# Patient Record
Sex: MALE | Race: WHITE | NOT HISPANIC OR LATINO | Employment: FULL TIME | URBAN - METROPOLITAN AREA
[De-identification: names, ages, dates, MRNs, and addresses within clinical notes are randomized per-mention and may not be internally consistent; named-entity substitution may affect disease eponyms.]

---

## 2019-02-14 LAB — COLONOSCOPY: NORMAL

## 2020-01-20 ENCOUNTER — OFFICE VISIT (OUTPATIENT)
Dept: FAMILY MEDICINE | Facility: CLINIC | Age: 55
End: 2020-01-20
Payer: COMMERCIAL

## 2020-01-20 VITALS
OXYGEN SATURATION: 96 % | BODY MASS INDEX: 30.99 KG/M2 | SYSTOLIC BLOOD PRESSURE: 128 MMHG | TEMPERATURE: 98.4 F | DIASTOLIC BLOOD PRESSURE: 87 MMHG | WEIGHT: 216.5 LBS | HEIGHT: 70 IN | HEART RATE: 53 BPM | RESPIRATION RATE: 16 BRPM

## 2020-01-20 DIAGNOSIS — R10.13 DYSPEPSIA: Primary | ICD-10-CM

## 2020-01-20 RX ORDER — IBUPROFEN 400 MG/1
400 TABLET, FILM COATED ORAL EVERY 6 HOURS PRN
COMMUNITY
End: 2022-03-13

## 2020-01-20 SDOH — HEALTH STABILITY: MENTAL HEALTH: HOW MANY STANDARD DRINKS CONTAINING ALCOHOL DO YOU HAVE ON A TYPICAL DAY?: 1 OR 2

## 2020-01-20 SDOH — HEALTH STABILITY: MENTAL HEALTH: HOW OFTEN DO YOU HAVE 6 OR MORE DRINKS ON ONE OCCASION?: LESS THAN MONTHLY

## 2020-01-20 SDOH — HEALTH STABILITY: MENTAL HEALTH: HOW OFTEN DO YOU HAVE A DRINK CONTAINING ALCOHOL?: 2-3 TIMES A WEEK

## 2020-01-20 ASSESSMENT — MIFFLIN-ST. JEOR: SCORE: 1823.29

## 2020-01-20 NOTE — PATIENT INSTRUCTIONS
Patient Education     1) We will send you home with a stool kit to bring us back a sample to test for H. Pylori, the bacteria that causes most stomach ulcers  2)  a box of Omeprazole 20mg (over the counter) and start taking one a day. Give it 3 days to take full effect.  3) If not effective at controlling symptoms, let me know and we will increase you to 40mg a day  4) If H. Pylori positive, we will treat you with antibiotics  5) If H. Pylori negative, we will plan to keep you on omeprazole for a month, then try to wean you off. Talk to me before stopping the omeprazole for plan for weaning off.       Lifestyle Changes for Controlling GERD  When you have GERD, stomach acid feels as if it s backing up toward your mouth. Whether or not you take medicine to control your GERD, your symptoms can often be improved with lifestyle changes. Talk to your healthcare provider about the following suggestions. These suggestions may help you get relief from your symptoms.      Raise your head  Reflux is more likely to strike when you re lying down flat, because stomach fluid can flow backward more easily. Raising the head of your bed 4 to 6 inches can help. To do this:    Slide blocks or books under the legs at the head of your bed. Or, place a wedge under the mattress. Many [employer] can make a suitable wedge for you. The wedge should run from your waist to the top of your head.    Don t just prop your head on several pillows. This increases pressure on your stomach. It can make GERD worse.  Watch your eating habits  Certain foods may increase the acid in your stomach or relax the lower esophageal sphincter. This makes GERD more likely. It s best to avoid the following if they cause you symptoms:    Coffee, tea, and carbonated drinks (with and without caffeine)    Fatty, fried, or spicy food    Mint, chocolate, onions, and tomatoes    Peppermint    Any other foods that seem to irritate your stomach or cause you  pain  Relieve the pressure  Tips include the following:    Eat smaller meals, even if you have to eat more often.    Don t lie down right after you eat. Wait a few hours for your stomach to empty.    Avoid tight belts and tight-fitting clothes.    Lose excess weight.  Tobacco and alcohol  Avoid smoking tobacco and drinking alcohol. They can make GERD symptoms worse.  Date Last Reviewed: 7/1/2016 2000-2019 [employer]. [address] [place] [address], [place] [address]. All rights reserved. This information is not intended as a substitute for professional medical care. Always follow your healthcare professional's instructions.

## 2020-01-20 NOTE — NURSING NOTE
"54 year old  Chief Complaint   Patient presents with     Establish Care     Abdominal Pain     upper abdomen, bloating and a burning sensation x 1 month has gotten worse in last 2 days       Blood pressure 128/87, pulse 53, temperature 98.4  F (36.9  C), temperature source Oral, resp. rate 16, height 1.786 m (5' 10.32\"), weight 98.2 kg (216 lb 8 oz), SpO2 96 %. Body mass index is 30.79 kg/m .  There is no problem list on file for this patient.      Wt Readings from Last 2 Encounters:   01/20/20 98.2 kg (216 lb 8 oz)   05/09/14 90.7 kg (200 lb)     BP Readings from Last 3 Encounters:   01/20/20 128/87   05/09/14 124/64   10/23/13 130/79         Current Outpatient Medications   Medication     ibuprofen (ADVIL/MOTRIN) 400 MG tablet     No current facility-administered medications for this visit.        Social History     Tobacco Use     Smoking status: Never Smoker     Smokeless tobacco: Never Used   Substance Use Topics     Alcohol use: Yes     Alcohol/week: 3.3 standard drinks     Frequency: 2-3 times a week     Drinks per session: 1 or 2     Binge frequency: Less than monthly     Drug use: Never       Health Maintenance Due   Topic Date Due     PREVENTIVE CARE VISIT  1965     HEPATITIS C SCREENING  1965     ADVANCE CARE PLANNING  1965     COLONOSCOPY  02/08/1973     HIV SCREENING  02/08/1978     LIPID  12/21/2009     ZOSTER IMMUNIZATION (2 of 2) 04/29/2019       No results found for: PAP      January 20, 2020 10:42 AM    "

## 2020-01-20 NOTE — PROGRESS NOTES
SUBJECTIVE:   Vitaliy Gonzalez is a 54 year old male who presents to clinic today to establish care and to discuss the following problem(s).     # Acid Reflux  - more bloated for the the past month after eating  - resolves after a while    - recently did a 2 week vacation, was eating more and had more bloating  - particularly bothersome with drinking alcohol so for the past couple of weeks has limited alcohol intake  - seems to have worsened recently  - worse after eating    - burning sensation from upper abdomen extending up into neck/mouth  - slept upright last night to help and bed is already tilted upright due to wife's reflux    - took Tums, didn't help  - took a dose of wife's omeprazole 40mg which helped    - no difficulty swallowing  - no melanotic stools  - bloating eases off somewhat after bowel movement, has 2-3 BMs a day which is normal for him    - takes ibuprofen for low back pain, averages 400mg in a day, rarely will take up to 1800mg in a 24 hour period (twice in the past 6 months)  - drinks 1-2 alcoholic drinks 2-3 times a week    - symptoms not worse with exertion/playing hockey    ROS: Denies fevers, chills, chest pain, difficulty breathing    Past Medical History:   Diagnosis Date     Chronic low back pain with left-sided sciatica     L4-L5, has had success with steroid injections. Goes to [facility] orth[facility] in [place]     History of kidney stones     2 stones     Past Surgical History:   Procedure Laterality Date     NO HISTORY OF SURGERY       Family History   Problem Relation Age of Onset     Breast Cancer Mother 45     Dementia Mother      No Known Problems Brother      Heart Disease Paternal Grandfather 85     No Known Problems Brother      Atrial fibrillation Father      Diabetes Maternal Aunt      Colon Cancer Maternal Uncle 55     Prostate Cancer No family hx of      Social History     Tobacco Use     Smoking status: Never Smoker     Smokeless tobacco: Never Used   Substance Use  "Topics     Alcohol use: Yes     Alcohol/week: 3.3 standard drinks     Frequency: 2-3 times a week     Drinks per session: 1 or 2     Binge frequency: Less than monthly     Drug use: Never     Social History     Social History Narrative    Works as  for [employer] 2-3 times a week    Lives with wife    Has 3 kids, all in college. One lives at home getting master's. One at , one at [employer].        Current Outpatient Medications   Medication     ibuprofen (ADVIL/MOTRIN) 400 MG tablet     No current facility-administered medications for this visit.      I have reviewed the patient's past medical, surgical, family, and social history.     OBJECTIVE:   /87 (BP Location: Right arm, Patient Position: Sitting, Cuff Size: Adult Large)   Pulse 53   Temp 98.4  F (36.9  C) (Oral)   Resp 16   Ht 1.786 m (5' 10.32\")   Wt 98.2 kg (216 lb 8 oz)   SpO2 96%   BMI 30.79 kg/m      Constitutional: well-appearing, appears stated age  Eyes: conjunctivae without erythema, sclera anicteric.   Abdomen: normal bowel sounds, non-distended, no palpable organomegaly or masses. Mildly tender to palpation in epigastric area and RUQ. [place] negative and no rebound tenderness or guarding.   Skin: no rashes, lesions, or wounds  Psych: affect is full and appropriate, speech is fluent and non-pressured    ASSESSMENT AND PLAN:     (R10.13) Dyspepsia  (primary encounter diagnosis)  Comment: Symptoms consistent with dyspepsia without any red flag features other than Vitaliy's age. Checking stool for H. Pylori antigen. If positive, will treat. If negative, continue with trial of OTC omeprazole for 3 weeks then will plan to wean him off with H2 blocker. If OTC dose ineffective, will plan for 40mg daily. If unable to get him off of PPI and H. Pylori negative, will plan for endoscopy. Reviewed general measures for GERD relief. We discussed that his ibuprofen may be contributing but 400mg daily dose would be unlikely to " have caused a significant gastropathy.   Plan: Helicobacter pylori Antigen Stool          Ayad Lamar MD   [facility]  01/20/2020, 10:59 AM

## 2020-01-20 NOTE — LETTER
[email or web address] Customer Service  [facility]  [address], [address]  [place] [address]  Fax: [phone]  Phone: [phone]      2020      Vitaliy Gonzalez  [address]  [place] [address]        Dear Vitaliy,    Thank you for your interest in becoming a [email or web address] user!    Your access code is: [email or web address]  Expires: 3/20/2020 10:08 AM     Please access the [email or web address] website at [email or web address].  Below the ID and password fields, select the  Sign Up Now  as New User.  You will be prompted to enter the access code listed above as well as additional personal information.  Please follow the directions carefully when creating your username and password.    If you allow your access code to , or if you have any questions please call a [email or web address] Representative at [phone] during normal clinic hours.     Sincerely,      [email or web address] Customer Service  [facility]

## 2020-01-21 LAB — H PYLORI AG STL QL IA: NEGATIVE

## 2020-03-15 ENCOUNTER — HEALTH MAINTENANCE LETTER (OUTPATIENT)
Age: 55
End: 2020-03-15

## 2021-01-14 ENCOUNTER — HEALTH MAINTENANCE LETTER (OUTPATIENT)
Age: 56
End: 2021-01-14

## 2021-05-09 ENCOUNTER — HEALTH MAINTENANCE LETTER (OUTPATIENT)
Age: 56
End: 2021-05-09

## 2021-10-24 ENCOUNTER — HEALTH MAINTENANCE LETTER (OUTPATIENT)
Age: 56
End: 2021-10-24

## 2022-01-20 ENCOUNTER — E-VISIT (OUTPATIENT)
Dept: URGENT CARE | Facility: CLINIC | Age: 57
End: 2022-01-20
Payer: COMMERCIAL

## 2022-01-20 DIAGNOSIS — U07.1 INFECTION DUE TO 2019 NOVEL CORONAVIRUS: Primary | ICD-10-CM

## 2022-01-20 PROCEDURE — 99207 PR NO CHARGE LOS: CPT | Performed by: FAMILY MEDICINE

## 2022-01-20 NOTE — PATIENT INSTRUCTIONS
At this time you would not be cleared to fly until 1- 10 days after your symptoms stated on 1-.    For documentation of recovery- you have to be at least 10 days out from day symptoms started to be considered safe to travel for the next 90 days without additional testing needed.  You show a documentation of recovery letter along with your + test result from the 18th and you do not have to test any further.    If you can change your flight you can [place] back with us to provide us this updated info for a letter to clear you to fly ON OR AFTER 1-.    Destiney Diego MD

## 2022-01-24 ENCOUNTER — TELEPHONE (OUTPATIENT)
Dept: FAMILY MEDICINE | Facility: CLINIC | Age: 57
End: 2022-01-24
Payer: COMMERCIAL

## 2022-03-09 NOTE — PROGRESS NOTES
ASSESSMENT AND PLAN:     (R79.89) Elevated serum creatinine  (primary encounter diagnosis)  Comment: Creatinine of 1.34 at ED visit 2/19/2022. History of kidney stones 10+ years ago. 6 months of NSAID use for chronic lower back pain a few years ago. No personal or family history of hypertension or chronic kidney disease. No urinary symptoms including dysuria or hematuria.   Plan:   - Ordered Basic metabolic panel to recheck creatinine and BUN            (Z11.59) Encounter for hepatitis C screening test for low risk patient  Plan: Hepatitis C Screen Reflex to HCV RNA Quant and         Genotype           (Z13.220) Screening cholesterol level  Plan: Lipid panel reflex to direct LDL Fasting            (Z11.4) Screening for HIV (human immunodeficiency virus)  Plan: HIV Antigen Antibody Combo          (Z23) Need for shingles vaccine  Plan: ZOSTER VACCINE RECOMBINANT ADJUVANTED IM NJX            (R10.11) RUQ abdominal pain  Comment: One first time episode of severe RUQ pain that required ED visit on 2/19/2022. Resolved with Maalox and famotidine. Has not recurred. No additional work up at this time.    (L81.4) Solar lentigo  Comment: Multiple solar lentigines on the right cheek and temple.   Plan: Encouraged sunscreen use    Encouraged him to follow up as soon as he can for a wellness visit.    ELIZABETH ONEILL, MS3  [employer]    I was present with the medical student who participated in the service and in the documentation of the note. I have verified the history and personally performed the physical exam and medical decision making. I agree with the assessment and plan of care as documented in the note with the following additions:   none    Ayad Lamar MD  4:10 PM, March 13, 2022        SUBJECTIVE:   Linwood is a 57 year old male who presents to clinic today for a follow up visit from the ED.    # RUIQ Pain  Patient experiences severe RUQ pain of a few hours duration that prompted  "him to present to the ED on 2/19/2022. Did not have any known dietary triggers, though he had recently switched back to a normal diet after eating keto. He had never experienced anything like this before. Pain resolved with Maalox and famotidine. Since then, patient has not had any recurrence in the pain. Has never had any melena, hematochezia and no recent changes in bowel movements, nausea or vomiting. However, during his ED workup his creatinine was 1.34 prompting him to make this follow up appointment for a recheck. He is otherwise feeling well today.    #Elevated Creatinine  - History of 2 episodes of kidney stones 7-10 years ago  - no history of hypertension, chronic kidney disease, or diabetes  - Had a period of NSAID use for chronic back pain of 6 month duration a few years ago  - Has some urinary frequency but no dysuria, hematuria or other urinary symptoms  - alcohol use: 4-6 beers/week    #Skin Changes on Face  - prompted by his wife to come in for a few spots on his right cheek  - Spots have been here for a few years and are asymptomatic  - never seen dermatologist  - no personal or family history of skin cancer  -  at home, limited sun exposure  - never sunburned to a peel      Patient Active Problem List   Diagnosis     Chronic low back pain with left-sided sciatica     History of kidney stones     Current Outpatient Medications   Medication     ibuprofen (ADVIL/MOTRIN) 400 MG tablet     No current facility-administered medications for this visit.       I have reviewed the patient's relevant past medical history.     OBJECTIVE:   /84 (BP Location: Right arm, Patient Position: Sitting, Cuff Size: Adult Large)   Pulse 69   Temp 97.7  F (36.5  C) (Temporal)   Ht 1.786 m (5' 10.32\")   Wt 94 kg (207 lb 4 oz)   SpO2 97%   BMI 29.47 kg/m      Constitutional: well-appearing, appears stated age  Eyes: conjunctivae without erythema, sclera anicteric.  GI: No tenderness to palpation, " rebound or guarding. Liver edge palpated.   Skin: Scattered light brown macules and patches on the right cheek and right temple. Photo included.  Psych: affect is full and appropriate, speech is fluent and non-pressured

## 2022-03-11 ENCOUNTER — OFFICE VISIT (OUTPATIENT)
Dept: FAMILY MEDICINE | Facility: CLINIC | Age: 57
End: 2022-03-11
Payer: COMMERCIAL

## 2022-03-11 VITALS
SYSTOLIC BLOOD PRESSURE: 137 MMHG | HEIGHT: 70 IN | WEIGHT: 207.25 LBS | HEART RATE: 69 BPM | TEMPERATURE: 97.7 F | DIASTOLIC BLOOD PRESSURE: 84 MMHG | BODY MASS INDEX: 29.67 KG/M2 | OXYGEN SATURATION: 97 %

## 2022-03-11 DIAGNOSIS — R79.89 ELEVATED SERUM CREATININE: Primary | ICD-10-CM

## 2022-03-11 DIAGNOSIS — Z23 NEED FOR SHINGLES VACCINE: ICD-10-CM

## 2022-03-11 DIAGNOSIS — L81.4 SOLAR LENTIGO: ICD-10-CM

## 2022-03-11 DIAGNOSIS — Z13.220 SCREENING CHOLESTEROL LEVEL: ICD-10-CM

## 2022-03-11 DIAGNOSIS — R10.11 RUQ ABDOMINAL PAIN: ICD-10-CM

## 2022-03-11 DIAGNOSIS — Z11.59 ENCOUNTER FOR HEPATITIS C SCREENING TEST FOR LOW RISK PATIENT: ICD-10-CM

## 2022-03-11 DIAGNOSIS — Z11.4 SCREENING FOR HIV (HUMAN IMMUNODEFICIENCY VIRUS): ICD-10-CM

## 2022-03-11 LAB
ANION GAP SERPL CALCULATED.3IONS-SCNC: 2 MMOL/L (ref 3–14)
BUN SERPL-MCNC: 22 MG/DL (ref 7–30)
CALCIUM SERPL-MCNC: 9.9 MG/DL (ref 8.5–10.1)
CHLORIDE BLD-SCNC: 108 MMOL/L (ref 94–109)
CHOLEST SERPL-MCNC: 305 MG/DL
CO2 SERPL-SCNC: 28 MMOL/L (ref 20–32)
CREAT SERPL-MCNC: 1.18 MG/DL (ref 0.66–1.25)
FASTING STATUS PATIENT QL REPORTED: NO
GFR SERPL CREATININE-BSD FRML MDRD: 71 ML/MIN/1.73M2
GLUCOSE BLD-MCNC: 77 MG/DL (ref 70–99)
HCV AB SERPL QL IA: NONREACTIVE
HDLC SERPL-MCNC: 113 MG/DL
HIV 1+2 AB+HIV1 P24 AG SERPL QL IA: NONREACTIVE
LDLC SERPL CALC-MCNC: 179 MG/DL
NONHDLC SERPL-MCNC: 192 MG/DL
POTASSIUM BLD-SCNC: 5 MMOL/L (ref 3.4–5.3)
SODIUM SERPL-SCNC: 138 MMOL/L (ref 133–144)
TRIGL SERPL-MCNC: 66 MG/DL

## 2022-03-11 PROCEDURE — 86803 HEPATITIS C AB TEST: CPT | Performed by: FAMILY MEDICINE

## 2022-03-11 PROCEDURE — 83718 ASSAY OF LIPOPROTEIN: CPT | Performed by: FAMILY MEDICINE

## 2022-03-11 PROCEDURE — 87389 HIV-1 AG W/HIV-1&-2 AB AG IA: CPT | Performed by: FAMILY MEDICINE

## 2022-03-11 PROCEDURE — 82310 ASSAY OF CALCIUM: CPT | Performed by: FAMILY MEDICINE

## 2022-03-11 NOTE — NURSING NOTE
"57 year old  Chief Complaint   Patient presents with     Hospital F/U     ED  [place] epigastric pain        Blood pressure 137/84, pulse 69, temperature 97.7  F (36.5  C), temperature source Temporal, height 1.786 m (5' 10.32\"), weight 94 kg (207 lb 4 oz), SpO2 97 %. Body mass index is 29.47 kg/m .  Patient Active Problem List   Diagnosis     Chronic low back pain with left-sided sciatica     History of kidney stones       Wt Readings from Last 2 Encounters:   03/11/22 94 kg (207 lb 4 oz)   01/20/20 98.2 kg (216 lb 8 oz)     BP Readings from Last 3 Encounters:   03/11/22 137/84   01/20/20 128/87   05/09/14 124/64         Current Outpatient Medications   Medication     ibuprofen (ADVIL/MOTRIN) 400 MG tablet     No current facility-administered medications for this visit.       Social History     Tobacco Use     Smoking status: Never Smoker     Smokeless tobacco: Never Used   Substance Use Topics     Alcohol use: Yes     Alcohol/week: 3.3 standard drinks     Drug use: Never       Health Maintenance Due   Topic Date Due     PREVENTIVE CARE VISIT  Never done     ADVANCE CARE PLANNING  Never done     HIV SCREENING  Never done     HEPATITIS C SCREENING  Never done     LIPID  12/21/2009     ZOSTER IMMUNIZATION (2 of 2) 04/29/2019     INFLUENZA VACCINE (1) 09/01/2021     PHQ-2 (once per calendar year)  01/01/2022       No results found for: PAP      March 11, 2022 8:17 AM  "

## 2022-03-15 ENCOUNTER — MYC MEDICAL ADVICE (OUTPATIENT)
Dept: FAMILY MEDICINE | Facility: CLINIC | Age: 57
End: 2022-03-15
Payer: COMMERCIAL

## 2022-03-15 DIAGNOSIS — Z13.220 SCREENING CHOLESTEROL LEVEL: Primary | ICD-10-CM

## 2022-03-18 ENCOUNTER — LAB (OUTPATIENT)
Dept: LAB | Facility: CLINIC | Age: 57
End: 2022-03-18
Payer: COMMERCIAL

## 2022-03-18 DIAGNOSIS — Z13.220 SCREENING CHOLESTEROL LEVEL: ICD-10-CM

## 2022-03-18 LAB
CHOLEST SERPL-MCNC: 245 MG/DL
FASTING STATUS PATIENT QL REPORTED: YES
HDLC SERPL-MCNC: 88 MG/DL
LDLC SERPL CALC-MCNC: 144 MG/DL
NONHDLC SERPL-MCNC: 157 MG/DL
TRIGL SERPL-MCNC: 63 MG/DL

## 2022-03-18 PROCEDURE — 80061 LIPID PANEL: CPT

## 2022-06-05 ENCOUNTER — HEALTH MAINTENANCE LETTER (OUTPATIENT)
Age: 57
End: 2022-06-05

## 2022-06-08 ENCOUNTER — LAB (OUTPATIENT)
Dept: LAB | Facility: CLINIC | Age: 57
End: 2022-06-08
Attending: FAMILY MEDICINE
Payer: COMMERCIAL

## 2022-06-08 DIAGNOSIS — Z20.822 ENCOUNTER FOR LABORATORY TESTING FOR COVID-19 VIRUS: ICD-10-CM

## 2022-06-08 PROCEDURE — U0003 INFECTIOUS AGENT DETECTION BY NUCLEIC ACID (DNA OR RNA); SEVERE ACUTE RESPIRATORY SYNDROME CORONAVIRUS 2 (SARS-COV-2) (CORONAVIRUS DISEASE [COVID-19]), AMPLIFIED PROBE TECHNIQUE, MAKING USE OF HIGH THROUGHPUT TECHNOLOGIES AS DESCRIBED BY CMS-2020-01-R: HCPCS

## 2022-06-08 PROCEDURE — U0005 INFEC AGEN DETEC AMPLI PROBE: HCPCS

## 2022-06-09 LAB — SARS-COV-2 RNA RESP QL NAA+PROBE: NEGATIVE

## 2022-06-23 ENCOUNTER — LAB (OUTPATIENT)
Dept: LAB | Facility: CLINIC | Age: 57
End: 2022-06-23
Attending: FAMILY MEDICINE
Payer: COMMERCIAL

## 2022-06-23 DIAGNOSIS — Z20.822 ENCOUNTER FOR LABORATORY TESTING FOR COVID-19 VIRUS: ICD-10-CM

## 2022-06-23 PROCEDURE — U0003 INFECTIOUS AGENT DETECTION BY NUCLEIC ACID (DNA OR RNA); SEVERE ACUTE RESPIRATORY SYNDROME CORONAVIRUS 2 (SARS-COV-2) (CORONAVIRUS DISEASE [COVID-19]), AMPLIFIED PROBE TECHNIQUE, MAKING USE OF HIGH THROUGHPUT TECHNOLOGIES AS DESCRIBED BY CMS-2020-01-R: HCPCS

## 2022-06-23 PROCEDURE — U0005 INFEC AGEN DETEC AMPLI PROBE: HCPCS

## 2022-06-24 LAB — SARS-COV-2 RNA RESP QL NAA+PROBE: NEGATIVE

## 2022-09-07 ENCOUNTER — NURSE TRIAGE (OUTPATIENT)
Dept: NURSING | Facility: CLINIC | Age: 57
End: 2022-09-07

## 2022-09-07 ENCOUNTER — HOSPITAL ENCOUNTER (EMERGENCY)
Facility: CLINIC | Age: 57
Discharge: HOME | End: 2022-09-07
Attending: PHYSICIAN ASSISTANT | Admitting: PHYSICIAN ASSISTANT
Payer: COMMERCIAL

## 2022-09-07 ENCOUNTER — APPOINTMENT (OUTPATIENT)
Dept: CT IMAGING | Facility: CLINIC | Age: 57
End: 2022-09-07
Attending: EMERGENCY MEDICINE
Payer: COMMERCIAL

## 2022-09-07 VITALS
HEART RATE: 71 BPM | TEMPERATURE: 97.2 F | HEIGHT: 69 IN | BODY MASS INDEX: 28.14 KG/M2 | OXYGEN SATURATION: 100 % | SYSTOLIC BLOOD PRESSURE: 120 MMHG | WEIGHT: 190 LBS | RESPIRATION RATE: 16 BRPM | DIASTOLIC BLOOD PRESSURE: 60 MMHG

## 2022-09-07 DIAGNOSIS — N20.0 KIDNEY STONE: ICD-10-CM

## 2022-09-07 LAB
ALBUMIN SERPL-MCNC: 3.6 G/DL (ref 3.4–5)
ALBUMIN UR-MCNC: NEGATIVE MG/DL
ALP SERPL-CCNC: 75 U/L (ref 40–150)
ALT SERPL W P-5'-P-CCNC: 25 U/L (ref 0–70)
ANION GAP SERPL CALCULATED.3IONS-SCNC: 3 MMOL/L (ref 3–14)
APPEARANCE UR: CLEAR
AST SERPL W P-5'-P-CCNC: 25 U/L (ref 0–45)
BASOPHILS # BLD AUTO: 0 10E3/UL (ref 0–0.2)
BASOPHILS NFR BLD AUTO: 1 %
BILIRUB SERPL-MCNC: 1.3 MG/DL (ref 0.2–1.3)
BILIRUB UR QL STRIP: NEGATIVE
BUN SERPL-MCNC: 17 MG/DL (ref 7–30)
CALCIUM SERPL-MCNC: 8.8 MG/DL (ref 8.5–10.1)
CHLORIDE BLD-SCNC: 107 MMOL/L (ref 94–109)
CO2 SERPL-SCNC: 26 MMOL/L (ref 20–32)
COLOR UR AUTO: ABNORMAL
CREAT SERPL-MCNC: 1.13 MG/DL (ref 0.66–1.25)
EOSINOPHIL # BLD AUTO: 0.5 10E3/UL (ref 0–0.7)
EOSINOPHIL NFR BLD AUTO: 8 %
ERYTHROCYTE [DISTWIDTH] IN BLOOD BY AUTOMATED COUNT: 13.2 % (ref 10–15)
GFR SERPL CREATININE-BSD FRML MDRD: 75 ML/MIN/1.73M2
GLUCOSE BLD-MCNC: 109 MG/DL (ref 70–99)
GLUCOSE UR STRIP-MCNC: NEGATIVE MG/DL
HCT VFR BLD AUTO: 44.2 % (ref 40–53)
HGB BLD-MCNC: 14.6 G/DL (ref 13.3–17.7)
HGB UR QL STRIP: ABNORMAL
IMM GRANULOCYTES # BLD: 0 10E3/UL
IMM GRANULOCYTES NFR BLD: 0 %
KETONES UR STRIP-MCNC: NEGATIVE MG/DL
LEUKOCYTE ESTERASE UR QL STRIP: NEGATIVE
LIPASE SERPL-CCNC: 86 U/L (ref 73–393)
LYMPHOCYTES # BLD AUTO: 1.7 10E3/UL (ref 0.8–5.3)
LYMPHOCYTES NFR BLD AUTO: 29 %
MCH RBC QN AUTO: 29.5 PG (ref 26.5–33)
MCHC RBC AUTO-ENTMCNC: 33 G/DL (ref 31.5–36.5)
MCV RBC AUTO: 89 FL (ref 78–100)
MONOCYTES # BLD AUTO: 0.7 10E3/UL (ref 0–1.3)
MONOCYTES NFR BLD AUTO: 11 %
NEUTROPHILS # BLD AUTO: 3.1 10E3/UL (ref 1.6–8.3)
NEUTROPHILS NFR BLD AUTO: 51 %
NITRATE UR QL: NEGATIVE
NRBC # BLD AUTO: 0 10E3/UL
NRBC BLD AUTO-RTO: 0 /100
PH UR STRIP: 5.5 [PH] (ref 5–7)
PLATELET # BLD AUTO: 256 10E3/UL (ref 150–450)
POTASSIUM BLD-SCNC: 4.5 MMOL/L (ref 3.4–5.3)
PROT SERPL-MCNC: 6.5 G/DL (ref 6.8–8.8)
RBC # BLD AUTO: 4.95 10E6/UL (ref 4.4–5.9)
RBC URINE: 1 /HPF
SODIUM SERPL-SCNC: 136 MMOL/L (ref 133–144)
SP GR UR STRIP: 1 (ref 1–1.03)
UROBILINOGEN UR STRIP-MCNC: NORMAL MG/DL
WBC # BLD AUTO: 6 10E3/UL (ref 4–11)
WBC URINE: 1 /HPF

## 2022-09-07 PROCEDURE — 74176 CT ABD & PELVIS W/O CONTRAST: CPT

## 2022-09-07 PROCEDURE — 85025 COMPLETE CBC W/AUTO DIFF WBC: CPT | Performed by: EMERGENCY MEDICINE

## 2022-09-07 PROCEDURE — 81001 URINALYSIS AUTO W/SCOPE: CPT | Performed by: PHYSICIAN ASSISTANT

## 2022-09-07 PROCEDURE — 83690 ASSAY OF LIPASE: CPT | Performed by: EMERGENCY MEDICINE

## 2022-09-07 PROCEDURE — 99285 EMERGENCY DEPT VISIT HI MDM: CPT | Mod: 25

## 2022-09-07 PROCEDURE — 250N000011 HC RX IP 250 OP 636: Performed by: EMERGENCY MEDICINE

## 2022-09-07 PROCEDURE — 80053 COMPREHEN METABOLIC PANEL: CPT | Performed by: EMERGENCY MEDICINE

## 2022-09-07 PROCEDURE — 80053 COMPREHEN METABOLIC PANEL: CPT | Performed by: PHYSICIAN ASSISTANT

## 2022-09-07 PROCEDURE — 258N000003 HC RX IP 258 OP 636: Performed by: EMERGENCY MEDICINE

## 2022-09-07 PROCEDURE — 85025 COMPLETE CBC W/AUTO DIFF WBC: CPT | Performed by: PHYSICIAN ASSISTANT

## 2022-09-07 PROCEDURE — 81001 URINALYSIS AUTO W/SCOPE: CPT | Performed by: EMERGENCY MEDICINE

## 2022-09-07 PROCEDURE — 36415 COLL VENOUS BLD VENIPUNCTURE: CPT | Performed by: EMERGENCY MEDICINE

## 2022-09-07 PROCEDURE — 96374 THER/PROPH/DIAG INJ IV PUSH: CPT

## 2022-09-07 PROCEDURE — 96361 HYDRATE IV INFUSION ADD-ON: CPT

## 2022-09-07 RX ORDER — TAMSULOSIN HYDROCHLORIDE 0.4 MG/1
0.4 CAPSULE ORAL DAILY
Qty: 10 CAPSULE | Refills: 0 | Status: SHIPPED | OUTPATIENT
Start: 2022-09-07 | End: 2023-03-07

## 2022-09-07 RX ORDER — OXYCODONE HYDROCHLORIDE 5 MG/1
5 TABLET ORAL EVERY 6 HOURS PRN
Qty: 12 TABLET | Refills: 0 | Status: SHIPPED | OUTPATIENT
Start: 2022-09-07 | End: 2022-09-10

## 2022-09-07 RX ORDER — ONDANSETRON 4 MG/1
4 TABLET, ORALLY DISINTEGRATING ORAL EVERY 8 HOURS PRN
Qty: 10 TABLET | Refills: 0 | Status: SHIPPED | OUTPATIENT
Start: 2022-09-07 | End: 2023-03-07

## 2022-09-07 RX ORDER — KETOROLAC TROMETHAMINE 15 MG/ML
15 INJECTION, SOLUTION INTRAMUSCULAR; INTRAVENOUS ONCE
Status: COMPLETED | OUTPATIENT
Start: 2022-09-07 | End: 2022-09-07

## 2022-09-07 RX ADMIN — KETOROLAC TROMETHAMINE 15 MG: 15 INJECTION, SOLUTION INTRAMUSCULAR; INTRAVENOUS at 11:57

## 2022-09-07 RX ADMIN — SODIUM CHLORIDE 1000 ML: 9 INJECTION, SOLUTION INTRAVENOUS at 11:57

## 2022-09-07 ASSESSMENT — ACTIVITIES OF DAILY LIVING (ADL): ADLS_ACUITY_SCORE: 35

## 2022-09-07 ASSESSMENT — ENCOUNTER SYMPTOMS
FLANK PAIN: 1
ABDOMINAL PAIN: 1

## 2022-09-07 NOTE — ED NOTES
Rapid Assessment Note    History:   Vitaliy Gonzalez is a 57 year old male who presents with acute right flank pain that feels like prior kidney stones, has not had any in over a decade.  No vomiting or fevers.  He would like CT to know how big it is and where it is.  Has never required surgeries for his kidney stones.    Exam:   General: no severe distress  HEENT: wearing mask  CV: appears well perfused  Resp: normal effort, speaks in full phrases  MSK: ambulatory  Neuro: clear speech, oriented  Psych: cooperative    Suspect recurrent R sided renal colic from ureteral stone, pt agrees to plan for CT, blood, UA, strain urine while awaiting further assessment.    Plan of Care:   I evaluated the patient and developed an initial plan of care. I discussed this plan and explained that I, or one of my partners, would be returning to complete the evaluation.     09/07/2022  [employer]    Portions of this medical record were completed by a scribe. UPON MY REVIEW AND AUTHENTICATION BY ELECTRONIC SIGNATURE, this confirms (a) I performed the applicable clinical services, and (b) the record is accurate.        Kalen Gallardo MD  09/07/22 8141

## 2022-09-07 NOTE — ED PROVIDER NOTES
History     Chief Complaint:  Flank Pain       HPI   Vitaliy Gonzalez is a 57 year old male with a history of kidney stones who presents to the ED for evaluation of flank/abdominal pain.  Patient reports onset of right-sided flank pain that began yesterday evening.  He describes this as feeling similar to his previous episode of kidney stones.  Pain has been constant since onset.  He notes that initially pain started in his RLQ, however has started to radiate into his right flank. Pain at its worst in 8-9/10 in severity. He tried ibuprofen without significant improvement of his pain. He denies any fevers, chills, nausea, vomiting, diarrhea, black or bloody stools.     ROS:  Review of Systems   Gastrointestinal: Positive for abdominal pain.   Genitourinary: Positive for flank pain.   All other systems reviewed and are negative.      Allergies:  No Known Allergies     Medications:    No medications    Past Medical History:    Past Medical History:   Diagnosis Date     Chronic low back pain with left-sided sciatica      History of kidney stones        Past Surgical History:    Past Surgical History:   Procedure Laterality Date     NO HISTORY OF SURGERY          Family History:    family history includes Atrial fibrillation in his father; Breast Cancer (age of onset: 45) in his mother; Colon Cancer (age of onset: 55) in his maternal uncle; Dementia in his mother; Diabetes in his maternal aunt; Diabetes Type 2  (age of onset: 52) in his brother; Heart Disease (age of onset: 85) in his paternal grandfather; No Known Problems in his brother; Prostate Cancer (age of onset: 79) in his father.    Social History:   reports that he has never smoked. He has never used smokeless tobacco. He reports current alcohol use of about 3.3 standard drinks of alcohol per week. He reports that he does not use drugs.    PCP: Ayad Lamar     Physical Exam     Patient Vitals for the past 24 hrs:   BP Temp Temp src Pulse Resp SpO2  "Height Weight   09/07/22 1144 120/60 97.2  F (36.2  C) Oral 71 16 100 % 1.753 m (5' 9\") 86.2 kg (190 lb)        Physical Exam  Constitutional: Pleasant. Cooperative.   Eyes: Pupils equally round and reactive  HENT: Head is normal in appearance. Oropharynx is normal with moist mucus membranes.  Cardiovascular: Regular rate and rhythm and without murmurs.  Respiratory: Normal respiratory effort, lungs are clear bilaterally.  GI: Abdomen is soft, non-tender, non-distended. No guarding, rebound, or rigidity.  Musculoskeletal: No asymmetry of the lower extremities, no tenderness to palpation. No CVA TTP.  Skin: Normal, without rash.  Neurologic: Cranial nerves grossly intact, normal cognition, no focal deficits. Alert and oriented x 3.   Psychiatric: Normal affect.  Nursing notes and vital signs reviewed.      Emergency Department Course     Imaging:  CT Abdomen Pelvis w/o Contrast   Final Result   IMPRESSION:    1.  5 mm stone at the right ureterovesicular junction with mild   proximal hydronephrosis and stranding.   2.  2 mm nonobstructing stone inferior right kidney.      AISHA NAGEL MD            SYSTEM ID:  [id number]         Laboratory:  Labs Ordered and Resulted from Time of ED Arrival to Time of ED Departure   COMPREHENSIVE METABOLIC PANEL - Abnormal       Result Value    Sodium 136      Potassium 4.5      Chloride 107      Carbon Dioxide (CO2) 26      Anion Gap 3      Urea Nitrogen 17      Creatinine 1.13      Calcium 8.8      Glucose 109 (*)     Alkaline Phosphatase 75      AST 25      ALT 25      Protein Total 6.5 (*)     Albumin 3.6      Bilirubin Total 1.3      GFR Estimate 75     ROUTINE UA WITH MICROSCOPIC REFLEX TO CULTURE - Abnormal    Color Urine Straw      Appearance Urine Clear      Glucose Urine Negative      Bilirubin Urine Negative      Ketones Urine Negative      Specific Gravity Urine 1.005      Blood Urine Small (*)     pH Urine 5.5      Protein Albumin Urine Negative      Urobilinogen Urine " Normal      Nitrite Urine Negative      Leukocyte Esterase Urine Negative      RBC Urine 1      WBC Urine 1     LIPASE - Normal    Lipase 86     CBC WITH PLATELETS AND DIFFERENTIAL    WBC Count 6.0      RBC Count 4.95      Hemoglobin 14.6      Hematocrit 44.2      MCV 89      MCH 29.5      MCHC 33.0      RDW 13.2      Platelet Count 256      % Neutrophils 51      % Lymphocytes 29      % Monocytes 11      % Eosinophils 8      % Basophils 1      % Immature Granulocytes 0      NRBCs per 100 WBC 0      Absolute Neutrophils 3.1      Absolute Lymphocytes 1.7      Absolute Monocytes 0.7      Absolute Eosinophils 0.5      Absolute Basophils 0.0      Absolute Immature Granulocytes 0.0      Absolute NRBCs 0.0        Emergency Department Course:    Reviewed:  I reviewed nursing notes, vitals, past medical history and Care Everywhere    Assessments:   I obtained history and examined the patient as noted above.    I rechecked the patient and explained findings.     Interventions:  Medications   0.9% sodium chloride BOLUS (0 mLs Intravenous Stopped 9/7/22 1558)   ketorolac (TORADOL) injection 15 mg (15 mg Intravenous Given 9/7/22 1157)        Disposition:  The patient was discharged to home.     Impression & Plan      Medical Decision Making:  Vitaliy Gonzalez is a 57 year old male who presents to the ED for evaluation of flank/abdominal pain.  See HPI as above for additional details.  Vitals and physical exam as above.  Differential is broad and included kidney stone, pyelonephritis, appendicitis,  pathology, perforated viscus, SBO, biliary pathology, shingles, amongst others.  Work-up obtained as above suggestive for kidney stone.  No associated JERONIMO nor evidence for UTI.  Symptoms improved with interventions as above.  With shared decision making, we will trial course of outpatient management.  Will send home with prescriptions as below.  Discussed narcotic precautions.  We will have him follow-up with urology with  persistent symptoms.  [place] patient was safe for discharge to home. Discussed reasons to return. All questions answered. Patient discharged to home in stable condition.    Diagnosis:    ICD-10-CM    1. Kidney stone  N20.0         Discharge Medications:  Discharge Medication List as of 9/7/2022  3:58 PM      START taking these medications    Details   ondansetron (ZOFRAN ODT) 4 MG ODT tab Take 1 tablet (4 mg) by mouth every 8 hours as needed for nausea, Disp-10 tablet, R-0, E-Prescribe      oxyCODONE (ROXICODONE) 5 MG tablet Take 1 tablet (5 mg) by mouth every 6 hours as needed for pain, Disp-12 tablet, R-0, E-Prescribe      tamsulosin (FLOMAX) 0.4 MG capsule Take 1 capsule (0.4 mg) by mouth daily, Disp-10 capsule, R-0, E-Prescribe              9/7/2022   Yaniv Salas PA-C     This record was created at least in part using electronic voice recognition software, so please excuse any typographical errors.       Yaniv Salas PA-C  09/07/22 9311

## 2022-09-07 NOTE — ED TRIAGE NOTES
Patient reports right flank pain. History of kidney stones.     Triage Assessment     Row Name 09/07/22 1127       Triage Assessment (Adult)    Airway WDL WDL       Cardiac WDL    Cardiac WDL WDL       Cognitive/Neuro/Behavioral WDL    Cognitive/Neuro/Behavioral WDL WDL

## 2022-09-07 NOTE — TELEPHONE ENCOUNTER
History of kidney stones    And now having flank pain all night    No blood in the urine    Was up all night with the pain    Advised going to the ED for evaluation    Gracie Bronson RN  [place] Nurse Advisor  8:10 AM  9/7/2022      Reason for Disposition    SEVERE pain (e.g., excruciating, scale 8-10) and present > 1 hour    Additional Information    Negative: Passed out (i.e., lost consciousness, collapsed and was not responding)    Negative: Shock suspected (e.g., cold/pale/clammy skin, too weak to stand, low BP, rapid pulse)    Negative: Sounds like a life-threatening emergency to the triager    Protocols used: FLANK PAIN-A-OH

## 2022-09-07 NOTE — DISCHARGE INSTRUCTIONS
Use ibuprofen 600mg every 6-8 hours for pain.  Use oxycodone for breakthrough pain. This is sedating. Do not drive after taking.  Use Zofran to help with nausea.  Use Tamsulosin to help facilitate stone passage.

## 2022-10-15 ENCOUNTER — HEALTH MAINTENANCE LETTER (OUTPATIENT)
Age: 57
End: 2022-10-15

## 2023-03-08 NOTE — PROGRESS NOTES
"[address], [address]  [facility] [address]  Phone: [phone]  Fax: [phone]  Primary Provider: Ayad Lamar  Pre-op Performing Provider: ANA OTT    PREOPERATIVE EVALUATION:  Today's date: 3/9/2023    Vitaliy Gonzalez is a 58 year old male who presents for a preoperative evaluation.    Surgical Information:  Surgery/Procedure: Hip Replacement  Surgery Location: [facility]  Surgeon: Bebeto Perez MD   Surgery Date: 4/5/23  Time of Surgery: TBD  Where patient plans to recover: At home with family  Fax number for surgical facility: [phone]    Type of Anesthesia Anticipated: General    Assessment & Plan     The proposed surgical procedure is considered INTERMEDIATE risk.    Vitaliy was seen today for pre-op exam.    Diagnoses and all orders for this visit:    Osteoarthritis of right hip, unspecified osteoarthritis type    Preop general physical exam      Osteoarthritis of right hip, unspecified osteoarthritis type  - Patient medically optimized for surgery baring any acute illness over next 4 weeks.   - METS activity >8. No difficulties climbing stairs, has continued to play hockey  - No past medical or surgical history complicating ability to proceed with surgery       Risks and Recommendations:  The patient has the following additional risks and recommendations for perioperative complications:   - No identified additional risk factors other than previously addressed    Medication Instructions:  Recommend holding supplemental \"[employer]\" 10 days prior to surgery  Patient is on no chronic medications    RECOMMENDATION:  APPROVAL GIVEN to proceed with proposed procedure, without further diagnostic evaluation.    Herminio Dela Cruz, MS3    I was present with the medical student who participated in the service and in the documentation of the note. I have verified the history and personally performed the physical exam and medical " decision making. I agree with the assessment and plan of care as documented in the note.     Ric De La Torre MD  11:09 AM, March 9, 2023      Subjective     HPI related to upcoming procedure:  57 yo male with past medical history of kidney stones here for pre-op for hip replacement. Has had progressive R hip pain found to have osteoarthritis on imaging in December 2022. Initial treatment with injection provided temporary but non-lasting relief. Has still been able to play hockey recently but only for about 20 second bouts with pain on exertion. No pain noticed at rest. Has taken Aleve PRN for pain.    No breathing difficulties, heart palpitations, nausea, vomiting, or changes in balance.    Preop Questions 3/2/2023   1. Have you ever had a heart attack or stroke? No   2. Have you ever had surgery on your heart or blood vessels, such as a stent placement, a coronary artery bypass, or surgery on an artery in your head, neck, heart, or legs? No   3. Do you have chest pain with activity? No   4. Do you have a history of  heart failure? No   5. Do you currently have a cold, bronchitis or symptoms of other infection? No   6. Do you have a cough, shortness of breath, or wheezing? No   7. Do you or anyone in your family have previous history of blood clots? No   8. Do you or does anyone in your family have a serious bleeding problem such as prolonged bleeding following surgeries or cuts? No   9. Have you ever had problems with anemia or been told to take iron pills? No   10. Have you had any abnormal blood loss such as black, tarry or bloody stools? No   11. Have you ever had a blood transfusion? No   12. Are you willing to have a blood transfusion if it is medically needed before, during, or after your surgery? Yes   13. Have you or any of your relatives ever had problems with anesthesia? No   14. Do you have sleep apnea, excessive snoring or daytime drowsiness? No   15. Do you have any artifical heart valves or other  implanted medical devices like a pacemaker, defibrillator, or continuous glucose monitor? No   16. Do you have artificial joints? No   17. Are you allergic to latex? No       Health Care Directive:  Patient does not have a Health Care Directive or Living Will: Discussed advance care planning with patient; information given to patient to review.    Preoperative Review of :   reviewed - no record of controlled substances prescribed.      Status of Chronic Conditions:  See problem list for active medical problems.  Problems all longstanding and stable, except as noted/documented.  See ROS for pertinent symptoms related to these conditions.      Review of Systems  CONSTITUTIONAL: NEGATIVE for fever, chills, change in weight  EYES: NEGATIVE for vision changes or irritation  ENT/MOUTH: NEGATIVE for ear, mouth and throat problems  RESP: NEGATIVE for significant cough or SOB  CV: NEGATIVE for chest pain, palpitations or peripheral edema  : negative for dysuria, hematuria, decreased urinary stream, erectile dysfunction    Patient Active Problem List    Diagnosis Date Noted     Chronic low back pain with left-sided sciatica      Priority: Medium     L4-L5, has had success with steroid injections. Goes to [facility] in [place]       History of kidney stones      Priority: Medium     2 stones        Past Medical History:   Diagnosis Date     Chronic low back pain with left-sided sciatica     L4-L5, has had success with steroid injections. Goes to [facility] in [place]     History of kidney stones     2 stones     Past Surgical History:   Procedure Laterality Date     NO HISTORY OF SURGERY       No current outpatient medications on file.       No Known Allergies     Social History     Tobacco Use     Smoking status: Never     Smokeless tobacco: Never   Substance Use Topics     Alcohol use: Yes     Alcohol/week: 3.3 standard drinks     Family History   Problem Relation Age of Onset     Breast Cancer  "Mother 45     Dementia Mother      No Known Problems Brother      Heart Disease Paternal Grandfather 85     Diabetes Type 2  Brother 52     Atrial fibrillation Father      Prostate Cancer Father 79     Diabetes Maternal Aunt      Colon Cancer Maternal Uncle 55     History   Drug Use Unknown         Objective     /83   Pulse 63   Temp 97.7  F (36.5  C)   Ht 1.753 m (5' 9.02\")   Wt 94.4 kg (208 lb 1.3 oz)   SpO2 97%   BMI 30.71 kg/m      Physical Exam    GENERAL APPEARANCE: healthy, alert and no distress     EYES: EOMI,  PERRL     HENT: nose and mouth without ulcers or lesions     NECK: no adenopathy, no asymmetry, masses, or scars and thyroid normal to palpation     RESP: lungs clear to auscultation - no rales, rhonchi or wheezes     CV: regular rates and rhythm, normal S1 S2, no S3 or S4 and no murmur, click or rub     ABDOMEN:  soft, nontender, no HSM or masses and bowel sounds normal     MS: extremities normal- no gross deformities noted, pain with manipulation of R lower extremity     SKIN: no suspicious lesions or rashes     NEURO: Normal strength and tone, sensory exam grossly normal, mentation intact and speech normal     PSYCH: mentation appears normal. and affect normal/bright     LYMPHATICS: No cervical adenopathy    Recent Labs   Lab Test 09/07/22  1154 03/11/22  0854   HGB 14.6  --      --     138   POTASSIUM 4.5 5.0   CR 1.13 1.18        Diagnostics:  No labs were ordered during this visit.   No EKG required, no history of coronary heart disease, significant arrhythmia, peripheral arterial disease or other structural heart disease.    Revised Cardiac Risk Index (RCRI):  The patient has the following serious cardiovascular risks for perioperative complications:   - No serious cardiac risks = 0 points     RCRI Interpretation: 0 points: Class I (very low risk - 0.4% complication rate)      Signed Electronically by: Ric De La Torre MD  Copy of this evaluation report is provided to " requesting physician.

## 2023-03-09 ENCOUNTER — OFFICE VISIT (OUTPATIENT)
Dept: FAMILY MEDICINE | Facility: CLINIC | Age: 58
End: 2023-03-09
Payer: COMMERCIAL

## 2023-03-09 VITALS
TEMPERATURE: 97.7 F | OXYGEN SATURATION: 97 % | HEART RATE: 63 BPM | BODY MASS INDEX: 30.82 KG/M2 | SYSTOLIC BLOOD PRESSURE: 122 MMHG | HEIGHT: 69 IN | WEIGHT: 208.08 LBS | DIASTOLIC BLOOD PRESSURE: 83 MMHG

## 2023-03-09 DIAGNOSIS — M16.11 OSTEOARTHRITIS OF RIGHT HIP, UNSPECIFIED OSTEOARTHRITIS TYPE: Primary | ICD-10-CM

## 2023-03-09 DIAGNOSIS — Z01.818 PREOP GENERAL PHYSICAL EXAM: ICD-10-CM

## 2023-03-09 PROBLEM — M19.90 OSTEOARTHRITIS: Status: ACTIVE | Noted: 2022-08-01

## 2023-03-13 ENCOUNTER — TELEPHONE (OUTPATIENT)
Dept: FAMILY MEDICINE | Facility: CLINIC | Age: 58
End: 2023-03-13

## 2023-03-13 DIAGNOSIS — Z01.818 PREOP GENERAL PHYSICAL EXAM: Primary | ICD-10-CM

## 2023-03-13 NOTE — TELEPHONE ENCOUNTER
Placing order for CBC w/ Platelets per [facility] request for pt's pre-op exam.  Routing back to  for scheduling.    URSULA PradoN, RN  03/13/23, 11:13 AM

## 2023-03-13 NOTE — TELEPHONE ENCOUNTER
[facility] reached out and asked that Dr. De La Torre put in an order for CBC for patient's pre op.    We need to reach out and schedule once placed.    Josie

## 2023-03-23 ENCOUNTER — LAB (OUTPATIENT)
Dept: LAB | Facility: CLINIC | Age: 58
End: 2023-03-23
Payer: COMMERCIAL

## 2023-03-23 DIAGNOSIS — Z01.818 PREOP GENERAL PHYSICAL EXAM: ICD-10-CM

## 2023-03-23 LAB
ERYTHROCYTE [DISTWIDTH] IN BLOOD BY AUTOMATED COUNT: 13.9 % (ref 10–15)
HCT VFR BLD AUTO: 48.1 % (ref 40–53)
HGB BLD-MCNC: 15.6 G/DL (ref 13.3–17.7)
MCH RBC QN AUTO: 29.4 PG (ref 26.5–33)
MCHC RBC AUTO-ENTMCNC: 32.4 G/DL (ref 31.5–36.5)
MCV RBC AUTO: 91 FL (ref 78–100)
PLATELET # BLD AUTO: 285 10E3/UL (ref 150–450)
RBC # BLD AUTO: 5.31 10E6/UL (ref 4.4–5.9)
WBC # BLD AUTO: 7.5 10E3/UL (ref 4–11)

## 2023-04-05 ENCOUNTER — TRANSFERRED RECORDS (OUTPATIENT)
Dept: HEALTH INFORMATION MANAGEMENT | Facility: CLINIC | Age: 58
End: 2023-04-05
Payer: COMMERCIAL

## 2023-06-11 ENCOUNTER — HEALTH MAINTENANCE LETTER (OUTPATIENT)
Age: 58
End: 2023-06-11

## 2023-12-25 ENCOUNTER — OFFICE VISIT (OUTPATIENT)
Dept: URGENT CARE | Facility: URGENT CARE | Age: 58
End: 2023-12-25
Payer: COMMERCIAL

## 2023-12-25 VITALS
WEIGHT: 212 LBS | RESPIRATION RATE: 14 BRPM | BODY MASS INDEX: 31.29 KG/M2 | DIASTOLIC BLOOD PRESSURE: 78 MMHG | SYSTOLIC BLOOD PRESSURE: 118 MMHG | HEART RATE: 65 BPM | TEMPERATURE: 98.1 F | OXYGEN SATURATION: 97 %

## 2023-12-25 DIAGNOSIS — H66.001 RIGHT ACUTE SUPPURATIVE OTITIS MEDIA: Primary | ICD-10-CM

## 2023-12-25 DIAGNOSIS — R07.0 THROAT PAIN: ICD-10-CM

## 2023-12-25 LAB — DEPRECATED S PYO AG THROAT QL EIA: NEGATIVE

## 2023-12-25 PROCEDURE — 87651 STREP A DNA AMP PROBE: CPT | Performed by: FAMILY MEDICINE

## 2023-12-25 PROCEDURE — 99213 OFFICE O/P EST LOW 20 MIN: CPT | Performed by: FAMILY MEDICINE

## 2023-12-25 RX ORDER — AMOXICILLIN 875 MG
875 TABLET ORAL 2 TIMES DAILY
Qty: 10 TABLET | Refills: 0 | Status: ON HOLD | OUTPATIENT
Start: 2023-12-25 | End: 2024-05-03

## 2023-12-25 RX ORDER — LIDOCAINE HYDROCHLORIDE 20 MG/ML
15 SOLUTION OROPHARYNGEAL
Qty: 100 ML | Refills: 3 | Status: SHIPPED | OUTPATIENT
Start: 2023-12-25

## 2023-12-25 NOTE — PROGRESS NOTES
SUBJECTIVE:   Vitaliy Gonzalez is a 58 year old male presenting with a chief complaint of sore throat, nasal congestion, sensation of water in both ears.  Onset of symptoms was 4-5 days ago.  Course of illness is worsening. .    Current and Associated symptoms:   There has been coughing triggered by postnasal drainage over the past 24 hours.    No pressure at the at the sinuses.    No fevers.   No headaches.   No coughing  No shortness of breath    Treatment measures tried include Sudafed, Ibuprofen, Neti Pot nasal rinses.  .  .  Predisposing factors include none.  .    No sick contacts with COVID-19    Patient's at-home COVID-19 test three two days ago was negative.      .  Past Medical History:   Diagnosis Date    Chronic low back pain with left-sided sciatica     L4-L5, has had success with steroid injections. Goes to [facility] orth[facility] in [place]    History of kidney stones     2 stones     Current Outpatient Medications   Medication Sig Dispense Refill    UNABLE TO FIND Take by mouth daily MEDICATION NAME: 8 greens       Social History     Tobacco Use    Smoking status: Never    Smokeless tobacco: Never   Substance Use Topics    Alcohol use: Yes     Alcohol/week: 3.3 standard drinks of alcohol       ROS:  CONSTITUTIONAL:  no fevers.  .    ENT/MOUTH:  positive for stuffy nose, sore throat.    RESP: negative for shortness of breath.      OBJECTIVE:  /78 (BP Location: Right arm, Patient Position: Chair, Cuff Size: Adult Regular)   Pulse 65   Temp 98.1  F (36.7  C) (Oral)   Resp 14   Wt 96.2 kg (212 lb)   SpO2 97%   BMI 31.29 kg/m    GENERAL APPEARANCE: healthy, alert and no distress. No acute respiratory distress.  .    HENT: TM erythematous, nasal turbinates erythematous, swollen, and oropharynx is erythematous without exudates.    NECK: supple, nontender, no lymphadenopathy  RESP: lungs clear to auscultation - no rales, rhonchi or wheezes  CV: regular rates and rhythm, normal S1 S2, no murmur  noted  SKIN: no suspicious lesions or rashes    LAB:    Results for orders placed or performed in visit on 12/25/23   Streptococcus A Rapid Screen w/Reflex to PCR - Clinic Collect     Status: Normal    Specimen: Throat; Swab   Result Value Ref Range    Group A Strep antigen Negative Negative       ASSESSMENT:  Throat Pain.    Right Otitis Media    PLAN:  For the otitis media:  Rx:  Amoxicillin  Tylenol, Ibuprofen  Follow up if not better in 7-10 days.     For the throat pain:  Rx: Viscous Lidocaine  Ice-cold liquids  Tylenol, ibuprofen  Pending lab:  Strep PCR Test.    Follow up if not better in 7-10 days.     Ladarius Bartlett MD

## 2023-12-25 NOTE — PATIENT INSTRUCTIONS
Drink plenty of ice-cold liquids.      Take tylenol, Ibuprofen for the pain.      Try Flonase nasal spray (two sprays in each nostril once a day) until the nasal symptoms improve.      Follow up if not better in 7-10 days.

## 2023-12-26 LAB — GROUP A STREP BY PCR: NOT DETECTED

## 2024-02-09 ENCOUNTER — TELEPHONE (OUTPATIENT)
Dept: FAMILY MEDICINE | Facility: CLINIC | Age: 59
End: 2024-02-09

## 2024-02-09 DIAGNOSIS — Z12.11 SCREENING FOR COLON CANCER: Primary | ICD-10-CM

## 2024-02-09 NOTE — TELEPHONE ENCOUNTER
Who is calling? Patient  What do they need? Referral for colonoscopy  Is this an insurance referral? No  Does caller need a call back? For scheduling of procedure  Additional Comments: Routine    Josie

## 2024-02-14 ENCOUNTER — TELEPHONE (OUTPATIENT)
Dept: GASTROENTEROLOGY | Facility: CLINIC | Age: 59
End: 2024-02-14
Payer: COMMERCIAL

## 2024-02-14 NOTE — TELEPHONE ENCOUNTER
"Endoscopy Scheduling Screen    Have you had a positive Covid test in the last 14 days?  No    Are you active on MyChart?   Yes    What insurance is in the chart?  Other:  BCBS    Ordering/Referring Provider:   MEE MORTON        (If ordering provider performs procedure, schedule with ordering provider unless otherwise instructed. )    BMI: Estimated body mass index is 31.29 kg/m  as calculated from the following:    Height as of 3/9/23: 1.753 m (5' 9.02\").    Weight as of 12/25/23: 96.2 kg (212 lb).     Sedation Ordered  moderate sedation.   If patient BMI > 50 do not schedule in [facility].    If patient BMI > 45 do not schedule at [facility].    Are you taking methadone or Suboxone?  No    Have you had difficulties, pain, or discomfort during past endoscopy procedures?  No    Are you taking any prescription medications for pain 3 or more times per week?   NO - No RN review required.    Do you have a history of malignant hyperthermia or adverse reaction to anesthesia?  No    Have you been diagnosed or told you have pulmonary hypertension?   No    Do you have an LVAD?  No    Have you been told you have moderate to severe sleep apnea?  No    Have you been told you have COPD, asthma, or any other lung disease?  No    Do you have any heart conditions?  No     Have you ever had an organ transplant?   No    Have you ever had or are you awaiting a heart or lung transplant?   No    Have you had a stroke or transient ischemic attack (TIA aka \"mini stroke\" in the last 6 months?   No    Have you been diagnosed with or been told you have cirrhosis of the liver?   No    Are you currently on dialysis?   No    Do you need assistance transferring?   No    BMI: Estimated body mass index is 31.29 kg/m  as calculated from the following:    Height as of 3/9/23: 1.753 m (5' 9.02\").    Weight as of 12/25/23: 96.2 kg (212 lb).     Is patients BMI > 40 and scheduling location [facility]?  No    Do you take an injectable medication for weight loss or " diabetes (excluding insulin)?  No    Do you take the medication Naltrexone?  No    Do you take blood thinners?  No       Prep   Are you currently on dialysis or do you have chronic kidney disease?  No    Do you have a diagnosis of diabetes?  No    Do you have a diagnosis of cystic fibrosis (CF)?  No    On a regular basis do you go 3 -5 days between bowel movements?  No    BMI > 40?  No    Preferred Pharmacy:    [facility]  [address]  [facility] [address]  Phone: [phone] Fax: [phone]    Final Scheduling Details   Colonoscopy prep sent?  N/A    Procedure scheduled  Colonoscopy    Surgeon:  CHANTAL     Date of procedure:  05/03/2024     Pre-OP / PAC:   No - Not required for this site.    Location  [facility] - Patient preference.    Sedation   Moderate Sedation - Per order.      Patient Reminders:   You will receive a call from a Nurse to review instructions and health history.  This assessment must be completed prior to your procedure.  Failure to complete the Nurse assessment may result in the procedure being cancelled.      On the day of your procedure, please designate an adult(s) who can drive you home stay with you for the next 24 hours. The medicines used in the exam will make you sleepy. You will not be able to drive.      You cannot take public transportation, ride share services, or non-medical taxi service without a responsible caregiver.  Medical transport services are allowed with the requirement that a responsible caregiver will receive you at your destination.  We require that drivers and caregivers are confirmed prior to your procedure.

## 2024-04-19 ENCOUNTER — TELEPHONE (OUTPATIENT)
Dept: GASTROENTEROLOGY | Facility: CLINIC | Age: 59
End: 2024-04-19
Payer: COMMERCIAL

## 2024-04-19 NOTE — TELEPHONE ENCOUNTER
Pre assessment completed for upcoming procedure.      Procedure details:    Patient scheduled for Colonoscopy  on 5.3.24.     Arrival time: 0830. Procedure time 0930    Facility location: [facility]; [address], [address], [place] [address]. Check in location: Main entrance at registration desk.    Sedation type: Conscious sedation     Pre op exam needed? N/A    Indication for procedure: screening     Designated  policy reviewed. Instructed to have someone stay 6 hours post procedure.       Chart review:     Electronic implanted devices? No    Recent diagnosis of diverticulitis within the last 6 weeks?  No    Diabetic? No      Medication review:    Anticoagulants? No    NSAIDS? Yes.  Ibuprofen (Advil, Motrin).  Holding interval of 1 day before procedure.    Other medication HOLDING recommendations:  N/A      Prep for procedure:     Bowel prep recommendation: Standard Miralax  Due to: standard bowel prep.    Prep instructions sent via powervault     Reviewed procedure prep instructions.     Patient verbalized understanding and had no questions or concerns at this time.        Gina Somers RN  Endoscopy Procedure Pre Assessment RN  [phone] option 4

## 2024-05-03 ENCOUNTER — HOSPITAL ENCOUNTER (OUTPATIENT)
Facility: CLINIC | Age: 59
Discharge: HOME | End: 2024-05-03
Attending: INTERNAL MEDICINE | Admitting: INTERNAL MEDICINE
Payer: COMMERCIAL

## 2024-05-03 VITALS
SYSTOLIC BLOOD PRESSURE: 124 MMHG | HEART RATE: 63 BPM | RESPIRATION RATE: 17 BRPM | DIASTOLIC BLOOD PRESSURE: 87 MMHG | OXYGEN SATURATION: 94 %

## 2024-05-03 LAB — COLONOSCOPY: NORMAL

## 2024-05-03 PROCEDURE — 45378 DIAGNOSTIC COLONOSCOPY: CPT | Performed by: INTERNAL MEDICINE

## 2024-05-03 PROCEDURE — 250N000011 HC RX IP 250 OP 636: Performed by: INTERNAL MEDICINE

## 2024-05-03 PROCEDURE — G0500 MOD SEDAT ENDO SERVICE >5YRS: HCPCS | Performed by: INTERNAL MEDICINE

## 2024-05-03 PROCEDURE — G0105 COLORECTAL SCRN; HI RISK IND: HCPCS | Performed by: INTERNAL MEDICINE

## 2024-05-03 RX ORDER — FLUMAZENIL 0.1 MG/ML
0.2 INJECTION, SOLUTION INTRAVENOUS
Status: DISCONTINUED | OUTPATIENT
Start: 2024-05-03 | End: 2024-05-03 | Stop reason: HOSPADM

## 2024-05-03 RX ORDER — LIDOCAINE 40 MG/G
CREAM TOPICAL
Status: DISCONTINUED | OUTPATIENT
Start: 2024-05-03 | End: 2024-05-03 | Stop reason: HOSPADM

## 2024-05-03 RX ORDER — FENTANYL CITRATE 50 UG/ML
INJECTION, SOLUTION INTRAMUSCULAR; INTRAVENOUS PRN
Status: DISCONTINUED | OUTPATIENT
Start: 2024-05-03 | End: 2024-05-03 | Stop reason: HOSPADM

## 2024-05-03 RX ORDER — NALOXONE HYDROCHLORIDE 0.4 MG/ML
0.4 INJECTION, SOLUTION INTRAMUSCULAR; INTRAVENOUS; SUBCUTANEOUS
Status: DISCONTINUED | OUTPATIENT
Start: 2024-05-03 | End: 2024-05-03 | Stop reason: HOSPADM

## 2024-05-03 RX ORDER — ONDANSETRON 4 MG/1
4 TABLET, ORALLY DISINTEGRATING ORAL EVERY 6 HOURS PRN
Status: DISCONTINUED | OUTPATIENT
Start: 2024-05-03 | End: 2024-05-03 | Stop reason: HOSPADM

## 2024-05-03 RX ORDER — ONDANSETRON 2 MG/ML
4 INJECTION INTRAMUSCULAR; INTRAVENOUS EVERY 6 HOURS PRN
Status: DISCONTINUED | OUTPATIENT
Start: 2024-05-03 | End: 2024-05-03 | Stop reason: HOSPADM

## 2024-05-03 RX ORDER — NALOXONE HYDROCHLORIDE 0.4 MG/ML
0.2 INJECTION, SOLUTION INTRAMUSCULAR; INTRAVENOUS; SUBCUTANEOUS
Status: DISCONTINUED | OUTPATIENT
Start: 2024-05-03 | End: 2024-05-03 | Stop reason: HOSPADM

## 2024-05-03 RX ORDER — ONDANSETRON 2 MG/ML
4 INJECTION INTRAMUSCULAR; INTRAVENOUS
Status: DISCONTINUED | OUTPATIENT
Start: 2024-05-03 | End: 2024-05-03 | Stop reason: HOSPADM

## 2024-05-03 RX ORDER — PROCHLORPERAZINE MALEATE 5 MG
10 TABLET ORAL EVERY 6 HOURS PRN
Status: DISCONTINUED | OUTPATIENT
Start: 2024-05-03 | End: 2024-05-03 | Stop reason: HOSPADM

## 2024-05-03 ASSESSMENT — ACTIVITIES OF DAILY LIVING (ADL): ADLS_ACUITY_SCORE: 35

## 2024-05-03 NOTE — H&P
ENDOSCOPY PRE-SEDATION H&P FOR OUTPATIENT PROCEDURES    Vitaliy Gonzalez  [id number]  1965    Procedure: colonoscopy    Pre-procedure diagnosis: hx polyps    Past medical history:   Past Medical History:   Diagnosis Date    Chronic low back pain with left-sided sciatica     L4-L5, has had success with steroid injections. Goes to [facility] orth[facility] in [place]    History of kidney stones     2 stones       Past surgical history:   Past Surgical History:   Procedure Laterality Date    NO HISTORY OF SURGERY         No current facility-administered medications for this encounter.       No Known Allergies    History of Anesthesia/Sedation Problems: no    PHYSICAL EXAMINATION:  Constitutional: aaox3, cooperative, pleasant  Vitals reviewed: BP (!) 135/104   Pulse 72   Resp 16   SpO2 99%   Wt:   Wt Readings from Last 2 Encounters:   12/25/23 96.2 kg (212 lb)   03/09/23 94.4 kg (208 lb 1.3 oz)      Eyes: Sclera anicteric/injected  Ears/nose/mouth/throat: Normal oropharynx without ulcers or exudate, mucus membranes moist, hearing intact  Neck: supple, thyroid normal size  CV: No edema  Respiratory: Unlabored breathing  Lymph: No submandibular, supraclavicular or inguinal lymphadenopathy  Abd: Nondistended, no masses, nontender  Skin: warm, perfused, no jaundice  Psych: Normal affect  MSK: normal movement on limited exam.    ASA Score: See Provation note    Assessment/Plan:     The patient is an appropriate candidate to receive sedation.    Informed consent was discussed with the patient/family, including the risks, benefits, potential complications and any alternative options associated with sedation.    Patient assessment completed just prior to sedation and while under constant observation by the provider. Condition determined to be adequate for proceeding with sedation.    The specific risks for the procedure were discussed with the patient at the time of informed consent and include but are not limited to  perforation which could require surgery, missing significant neoplasm or lesion, hemorrhage and adverse sedative complication.      Emir Carrasquillo MD

## 2024-08-04 ENCOUNTER — HEALTH MAINTENANCE LETTER (OUTPATIENT)
Age: 59
End: 2024-08-04

## 2025-03-24 ENCOUNTER — MYC MEDICAL ADVICE (OUTPATIENT)
Dept: FAMILY MEDICINE | Facility: CLINIC | Age: 60
End: 2025-03-24

## 2025-03-24 DIAGNOSIS — Z91.89 AT INCREASED RISK FOR CARDIOVASCULAR DISEASE: Primary | ICD-10-CM

## 2025-04-08 ENCOUNTER — HOSPITAL ENCOUNTER (OUTPATIENT)
Dept: CT IMAGING | Facility: CLINIC | Age: 60
Discharge: HOME | End: 2025-04-08
Attending: FAMILY MEDICINE | Admitting: FAMILY MEDICINE
Payer: COMMERCIAL

## 2025-04-08 DIAGNOSIS — Z91.89 AT INCREASED RISK FOR CARDIOVASCULAR DISEASE: ICD-10-CM

## 2025-04-08 PROCEDURE — 75571 CT HRT W/O DYE W/CA TEST: CPT

## 2025-04-08 PROCEDURE — 75571 CT HRT W/O DYE W/CA TEST: CPT | Mod: 26 | Performed by: STUDENT IN AN ORGANIZED HEALTH CARE EDUCATION/TRAINING PROGRAM

## 2025-04-10 ENCOUNTER — LAB (OUTPATIENT)
Dept: FAMILY MEDICINE | Facility: CLINIC | Age: 60
End: 2025-04-10
Attending: NURSE PRACTITIONER
Payer: COMMERCIAL

## 2025-04-10 ENCOUNTER — E-VISIT (OUTPATIENT)
Dept: URGENT CARE | Facility: CLINIC | Age: 60
End: 2025-04-10
Payer: COMMERCIAL

## 2025-04-10 DIAGNOSIS — J06.9 ACUTE UPPER RESPIRATORY INFECTION, UNSPECIFIED: Primary | ICD-10-CM

## 2025-04-10 DIAGNOSIS — J06.9 ACUTE UPPER RESPIRATORY INFECTION, UNSPECIFIED: ICD-10-CM

## 2025-04-10 LAB
DEPRECATED S PYO AG THROAT QL EIA: NEGATIVE
S PYO DNA THROAT QL NAA+PROBE: NOT DETECTED

## 2025-04-10 NOTE — PATIENT INSTRUCTIONS
Dear [place],    After reviewing your responses, I would like you to come in for a swab to make sure we treat you correctly. This swab is to evaluate you for possible COVID and Strep Throat, and should be scheduled for today or tomorrow. Please use the Schedule Now button in [facility] to schedule your swab. Otherwise, click this link to schedule a lab only appointment.    Lab appointments are not available at most locations on the weekends. If no Lab Only appointment is available, you should be seen in any of our convenient Urgent Care Centers for an in person visit, which can be found on our website here.    You will receive instructions with your results in [facility] once they are available.     If your symptoms worsen, you develop difficulty breathing, difficulty with drinking enough to stay hydrated, or fevers for more than 5 days, please contact your primary care provider for an appointment or visit an Urgent Care Center to be seen.      Thanks again for choosing us as your health care partner.   JOHN VALENTINO CNP

## 2025-08-21 ENCOUNTER — OFFICE VISIT (OUTPATIENT)
Dept: FAMILY MEDICINE | Facility: CLINIC | Age: 60
End: 2025-08-21
Payer: COMMERCIAL

## 2025-08-21 VITALS
TEMPERATURE: 97.3 F | WEIGHT: 199 LBS | DIASTOLIC BLOOD PRESSURE: 76 MMHG | OXYGEN SATURATION: 98 % | SYSTOLIC BLOOD PRESSURE: 131 MMHG | HEART RATE: 58 BPM | RESPIRATION RATE: 14 BRPM | BODY MASS INDEX: 28.49 KG/M2 | HEIGHT: 70 IN

## 2025-08-21 DIAGNOSIS — J02.9 SORE THROAT: Primary | ICD-10-CM

## 2025-08-21 LAB
DEPRECATED S PYO AG THROAT QL EIA: NEGATIVE
S PYO DNA THROAT QL NAA+PROBE: NOT DETECTED

## 2025-08-21 PROCEDURE — 87651 STREP A DNA AMP PROBE: CPT | Performed by: FAMILY MEDICINE

## (undated) RX ORDER — FENTANYL CITRATE 50 UG/ML
INJECTION, SOLUTION INTRAMUSCULAR; INTRAVENOUS
Status: DISPENSED
Start: 2024-05-03